# Patient Record
Sex: FEMALE | Employment: OTHER | URBAN - METROPOLITAN AREA
[De-identification: names, ages, dates, MRNs, and addresses within clinical notes are randomized per-mention and may not be internally consistent; named-entity substitution may affect disease eponyms.]

---

## 2023-06-07 ENCOUNTER — TRANSCRIBE ORDERS (OUTPATIENT)
Dept: HOME HEALTH SERVICES | Facility: HOME HEALTHCARE | Age: 88
End: 2023-06-07

## 2023-06-07 DIAGNOSIS — L03.115 CELLULITIS OF LEG, RIGHT: Primary | ICD-10-CM

## 2023-06-09 ENCOUNTER — HOME CARE VISIT (OUTPATIENT)
Dept: HOME HEALTH SERVICES | Facility: HOME HEALTHCARE | Age: 88
End: 2023-06-09
Payer: MEDICARE

## 2023-06-09 VITALS
OXYGEN SATURATION: 98 % | DIASTOLIC BLOOD PRESSURE: 80 MMHG | TEMPERATURE: 97.5 F | RESPIRATION RATE: 18 BRPM | HEART RATE: 65 BPM | SYSTOLIC BLOOD PRESSURE: 110 MMHG

## 2023-06-09 PROCEDURE — G0151 HHCP-SERV OF PT,EA 15 MIN: HCPCS

## 2023-06-09 NOTE — HOME HEALTH
"Assessment/Referral:  Pt is a 89 y/o female pt of Dr. Park referred to home care services for primary diagnosis of RLE cellulitis and associated weakness. Pt and pt's daughter report pt has been non-ambulatory for 3-4 months due to weakness and dizziness prior to recent med alterations by MD.  Upon evaluation of pt's mobility, PT noted pt to have fair tolerance to upright mobility and ability to ambulate x 50 ft with rollator and CGA.  Pt reports no dizziness throughout gait this date therefore PT encouraged pt to perform frequent bouts of short distance gait daily to slowly build aerobic endurance and upright tolerance.  Pt verbalized understanding and agreement.  Pt will benefit from continued skilled home care PT for strengthening, gait/balance training, progression of mobility and safety education.     Primary Focus of Care:  impaired mobility and weakness associated with cellulitis    PLOF/Environment:  Pt has been non-ambulatory for about 3-4 months due to weakness, lightheadedness and quick fatigue.  Pt now using transport wheelchair and assistance of family for all mobility.  Pt lives with her daughter in single story home with no basement. Pt's home has 2 steps with a rail to enter    Pt's Personal Goal:  Pt reports her primary goal is to \"be able to get on my feet and walk again\"    Homebound reason(s):  Pt is homebound due to considerable taxing effort to leave home including:  falls risk, fatigue, weakness, limited upright tolerance    Plan for Next Visit:  strengthening, gait training, safety education"

## 2023-06-13 ENCOUNTER — HOME CARE VISIT (OUTPATIENT)
Dept: HOME HEALTH SERVICES | Facility: HOME HEALTHCARE | Age: 88
End: 2023-06-13
Payer: MEDICARE

## 2023-06-13 VITALS — HEART RATE: 83 BPM | OXYGEN SATURATION: 92 % | TEMPERATURE: 97.8 F | RESPIRATION RATE: 18 BRPM

## 2023-06-13 PROCEDURE — G0151 HHCP-SERV OF PT,EA 15 MIN: HCPCS

## 2023-06-14 NOTE — HOME HEALTH
Pt reports feeling well today without any complaints.  Pt states yesterday she had more difficulty due to fatigue with having company at home.  PT encouraged pt to perform mobility only to tolerance for energy conservation.  Pt verbalized understanding and agreement.  Ther ex and gait performed in circuits today for building endurance and strength    Plan for next visit: strengthening, gait/balance training, transfer training

## 2023-06-15 ENCOUNTER — HOME CARE VISIT (OUTPATIENT)
Dept: HOME HEALTH SERVICES | Facility: HOME HEALTHCARE | Age: 88
End: 2023-06-15
Payer: MEDICARE

## 2023-06-15 VITALS
HEART RATE: 70 BPM | DIASTOLIC BLOOD PRESSURE: 70 MMHG | OXYGEN SATURATION: 94 % | RESPIRATION RATE: 18 BRPM | SYSTOLIC BLOOD PRESSURE: 115 MMHG | TEMPERATURE: 97.3 F

## 2023-06-15 PROCEDURE — G0151 HHCP-SERV OF PT,EA 15 MIN: HCPCS

## 2023-06-15 NOTE — HOME HEALTH
Pt reports increased dizziness over the last 2 days.  Pt states she experiences lightheaded feeling as if she is going to pass out when she is upright and attempting ambulation.  Pt states when seated, dizziness subsides. PT assisted pt in various standing and seated exercises as well as multiple transitions from sitting to standing without reports of dizziness today.  PT encouraged pt to perform upright activity only to tolerance.     Plan for next visit: assess BP, gait training to tolerance, strengthening